# Patient Record
Sex: FEMALE | Race: ASIAN | Employment: PART TIME | URBAN - METROPOLITAN AREA
[De-identification: names, ages, dates, MRNs, and addresses within clinical notes are randomized per-mention and may not be internally consistent; named-entity substitution may affect disease eponyms.]

---

## 2018-07-08 ENCOUNTER — OFFICE VISIT (OUTPATIENT)
Dept: PRIMARY CARE CLINIC | Age: 48
End: 2018-07-08
Payer: MEDICARE

## 2018-07-08 VITALS
TEMPERATURE: 99.5 F | HEIGHT: 62 IN | HEART RATE: 64 BPM | SYSTOLIC BLOOD PRESSURE: 84 MMHG | BODY MASS INDEX: 20.98 KG/M2 | DIASTOLIC BLOOD PRESSURE: 62 MMHG | WEIGHT: 114 LBS | RESPIRATION RATE: 16 BRPM | OXYGEN SATURATION: 98 %

## 2018-07-08 DIAGNOSIS — A08.4 VIRAL GASTROENTERITIS: Primary | ICD-10-CM

## 2018-07-08 DIAGNOSIS — R11.15 NON-INTRACTABLE CYCLICAL VOMITING WITH NAUSEA: ICD-10-CM

## 2018-07-08 PROCEDURE — 99203 OFFICE O/P NEW LOW 30 MIN: CPT | Performed by: NURSE PRACTITIONER

## 2018-07-08 PROCEDURE — 4004F PT TOBACCO SCREEN RCVD TLK: CPT | Performed by: NURSE PRACTITIONER

## 2018-07-08 PROCEDURE — G8420 CALC BMI NORM PARAMETERS: HCPCS | Performed by: NURSE PRACTITIONER

## 2018-07-08 PROCEDURE — G8427 DOCREV CUR MEDS BY ELIG CLIN: HCPCS | Performed by: NURSE PRACTITIONER

## 2018-07-08 RX ORDER — ONDANSETRON 4 MG/1
4 TABLET, ORALLY DISINTEGRATING ORAL EVERY 8 HOURS PRN
Qty: 90 TABLET | Refills: 0 | Status: SHIPPED | OUTPATIENT
Start: 2018-07-08

## 2018-07-08 ASSESSMENT — ENCOUNTER SYMPTOMS
SINUS PRESSURE: 0
TROUBLE SWALLOWING: 0
VOMITING: 1
PHOTOPHOBIA: 0
ABDOMINAL PAIN: 1
SHORTNESS OF BREATH: 0
EYE REDNESS: 0
EYE PAIN: 0
RHINORRHEA: 0
EYE ITCHING: 0
NAUSEA: 0
COUGH: 0
DIARRHEA: 0
EYE DISCHARGE: 0

## 2018-07-08 NOTE — PATIENT INSTRUCTIONS
clean. Wash your hands, cutting boards, and countertops with hot soapy water frequently. · Cook meat until it is well done. · Do not eat raw eggs or uncooked sauces made with raw eggs. · Do not take chances. If food looks or tastes spoiled, throw it out. When should you call for help? Call 911 anytime you think you may need emergency care. For example, call if:    · You vomit blood or what looks like coffee grounds.     · You passed out (lost consciousness).     · You pass maroon or very bloody stools.    Call your doctor now or seek immediate medical care if:    · You have severe belly pain.     · You have signs of needing more fluids. You have sunken eyes, a dry mouth, and pass only a little dark urine.     · You feel like you are going to faint.     · You have increased belly pain that does not go away in 1 to 2 days.     · You have new or increased nausea, or you are vomiting.     · You have a new or higher fever.     · Your stools are black and tarlike or have streaks of blood.    Watch closely for changes in your health, and be sure to contact your doctor if:    · You are dizzy or lightheaded.     · You urinate less than usual, or your urine is dark yellow or brown.     · You do not feel better with each day that goes by. Where can you learn more? Go to https://[email or web address].[employer]. org and sign in to your Compare And Share account. Enter N142 in the [facility] box to learn more about \"Gastroenteritis: Care Instructions. \"     If you do not have an account, please click on the \"Sign Up Now\" link. Current as of: November 18, 2017  Content Version: 11.6  © 3769-6683 [employer]. Care instructions adapted under license by [facility] ([facility]). If you have questions about a medical condition or this instruction, always ask your healthcare professional. [address]  any warranty or liability for your use of this information.        Patient Education        Normal decrease pain. · Use pads instead of tampons if you have pain in your vagina. · Take anti-inflammatory medicines to reduce pain. These include ibuprofen (Advil, Motrin) and naproxen (Aleve). Be safe with medicines. Read and follow all instructions on the label. Start taking the recommended dose when symptoms begin or one day before your menstrual period starts. If you are trying to become pregnant, talk to your doctor before you use any medicine. To manage menstrual bleeding  · Tampons range from small to large, for light to heavy flow. You can place a tampon in your vagina by using the slender tube packaged with the tampon. Or you can tuck it in with a finger. Change the tampon at least every 4 to 8 hours. This helps prevent leakage and infection. · Pads range from thin and light to thick and super absorbent. They protect your clothing, with or without using a tampon. · Menstrual cups are inserted in the vagina to collect menstrual flow. You remove the menstrual cup to empty it. Some are disposable and some can be washed and used again. · Whatever you use, be sure to change it regularly. Tampons are ideal for activities that pads are not practical for, such as swimming. When should you call for help? Watch closely for changes in your health, and be sure to contact your doctor if you have any problems. Where can you learn more? Go to https://chpenachoeb.[employer]. org and sign in to your [employer] account. Enter G858 in the [employer] box to learn more about \"Normal Menstrual Cycle: Care Instructions. \"     If you do not have an account, please click on the \"Sign Up Now\" link. Current as of: Whitney 10, 2017  Content Version: 11.6  © 1220-4110 [employer], [employer]. Care instructions adapted under license by [facility] "[employer] [facility] ([facility]).  If you have questions about a medical condition or this instruction, always ask your healthcare professional. Miguelina Buckley disclaims any warranty or liability for your use of this information.

## 2018-07-26 ENCOUNTER — OFFICE VISIT (OUTPATIENT)
Dept: OBGYN CLINIC | Age: 48
End: 2018-07-26
Payer: MEDICARE

## 2018-07-26 VITALS
DIASTOLIC BLOOD PRESSURE: 64 MMHG | WEIGHT: 112 LBS | SYSTOLIC BLOOD PRESSURE: 98 MMHG | BODY MASS INDEX: 21.99 KG/M2 | HEIGHT: 60 IN

## 2018-07-26 DIAGNOSIS — Z11.51 SCREENING FOR HPV (HUMAN PAPILLOMAVIRUS): ICD-10-CM

## 2018-07-26 DIAGNOSIS — Z01.419 WELL WOMAN EXAM WITH ROUTINE GYNECOLOGICAL EXAM: Primary | ICD-10-CM

## 2018-07-26 DIAGNOSIS — Z12.31 SCREENING MAMMOGRAM, ENCOUNTER FOR: ICD-10-CM

## 2018-07-26 DIAGNOSIS — Z01.419 PAP SMEAR, AS PART OF ROUTINE GYNECOLOGICAL EXAMINATION: ICD-10-CM

## 2018-07-26 PROCEDURE — 99386 PREV VISIT NEW AGE 40-64: CPT | Performed by: NURSE PRACTITIONER

## 2018-07-26 NOTE — PROGRESS NOTES
The patient was asked if she would like a chaperone present for her intimate exam. She  Declined the chaperone.  Shwetha Kite  Patient declines student to be present at appointment

## 2018-07-26 NOTE — PROGRESS NOTES
SUBJECTIVE:   57 y.o.  female here for well woman exam. Pt is accompanied by her spouse \"Edison\" today. Who reports the pt recently had her gallbladder removed a week ago. They thought her pain was because of cramping with her periods, but when she was diagnosed with gallstones, he and his wife are unsure if it was the periods that were the problem. Pt's  reports the pt has not seen a doctor regularly since their only child was born in . Pt with regular menses and no complaints today. Pt with no method for contraception and  reports since his \"accident\" they have not been [place] couple\" and don't need birth control. Pt reports regular exercise with work as a  and takes multivitamins and fish oil. Pt denies smoking and drinks ETOH never. Review of Systems:  General ROS: negative  Psychological ROS: negative  EENT ROS: negative  Endocrine ROS: negative  Respiratory ROS: no cough, shortness of breath, or wheezing  Cardiovascular ROS: no chest pain or dyspnea on exertion  Gastrointestinal ROS: no abdominal pain, change in bowel habits, or black or bloody stools  Genito-Urinary ROS: no dysuria, trouble voiding, or hematuria  Musculoskeletal ROS: negative  Neurological ROS: no TIA or stroke symptoms  Dermatological ROS: negative    OBJECTIVE: Please see chart for additional documentation  BP 98/64   Ht 5' (1.524 m)   Wt 112 lb (50.8 kg)   LMP 2018 (Exact Date)   Breastfeeding? No   BMI 21.87 kg/m²     Pt's medical and surgical history reviewed    Physical Exam:  CONSTITUTIONAL: She appears well and afebrile, normal weight    NEUROLOGIC: Alert and oriented to person, place and time  NECK: no thyroidmegaly  BREASTS: Bilateral breasts without masses, skin changes or nipple discharge   LUNGS: Clear to ascultation bilaterally  CVS: regular rate and rhythm  LYMPHATIC: No palpable lymph nodes  ABDOMEN: bowel sounds active all four quadrants. No apparent liver or splenic organomegaly. No palpation due to pt tenderness with recent gallbladder surgery, steri strips and sutures noted intact. SKIN: warm and dry, normal texture and tone, no lesions    Pelvic Exam:   EFG: normal external genitalia  URETHRA: normal appearing without diverticula or lesions  VULVA: normal appearing vulva with no masses, tenderness or lesions  VAGINA: normal rugae, scant yellow discharge   CERVIX: nulliparous, cervical polyp noted  UTERUS: uterus is normal size, shape, consistency and nontender   ADNEXA: normal adnexa in size, nontender and no masses. PERINEUM: normal appearing without lesions or masses  ANUS: normal appearing without lesions or masses, no hemorrhoids    ASSESSMENT:   Well woman with routine gynecologic exam    PLAN:   Pap with hpv pending  Reinforced self breast exam, mammogram ordered  Advised Folate 0.4mg, Calcium 1000 mg and Vitamin D 400-800 IU total intake daily. Encouraged healthy lifestyle and moderated exercise (30 mins walking daily). Pt verbalized understanding. Information given in Chinese in regards to well visit, breast exams/mammograms and pap testing for pt review. Informed pt's  that pt has a cervical polyp, precautions given. Advised for pt to continue to monitor s/s of dysmenorrhea now that gallbladder is removed, and to RTC if s/s worsen or fail to improve, pt and her  verbalized understanding. Routine health screenings and precautions discussed, RTC 12 months for well woman exam or visits PRN. Approximately 30 minutes total face to face time was spent with pt and more than half the time was spent counseling/educating pt and/or coordinating care.

## 2018-08-07 DIAGNOSIS — Z01.419 PAP SMEAR, AS PART OF ROUTINE GYNECOLOGICAL EXAMINATION: ICD-10-CM

## 2018-08-07 DIAGNOSIS — Z11.51 SCREENING FOR HPV (HUMAN PAPILLOMAVIRUS): ICD-10-CM

## 2019-10-31 ENCOUNTER — HOSPITAL ENCOUNTER (OUTPATIENT)
Dept: WOMENS IMAGING | Age: 49
Discharge: HOME | End: 2019-11-02
Payer: COMMERCIAL

## 2019-10-31 ENCOUNTER — HOSPITAL ENCOUNTER (OUTPATIENT)
Dept: ULTRASOUND IMAGING | Age: 49
Discharge: HOME | End: 2019-11-02
Payer: COMMERCIAL

## 2019-10-31 DIAGNOSIS — R92.8 ABNORMAL MAMMOGRAM: ICD-10-CM

## 2019-10-31 DIAGNOSIS — R52 PAIN: ICD-10-CM

## 2019-10-31 PROCEDURE — G0279 TOMOSYNTHESIS, MAMMO: HCPCS

## 2019-10-31 PROCEDURE — 76642 ULTRASOUND BREAST LIMITED: CPT

## 2020-02-26 ENCOUNTER — TELEPHONE (OUTPATIENT)
Dept: GASTROENTEROLOGY | Age: 50
End: 2020-02-26

## 2020-03-05 ENCOUNTER — HOSPITAL ENCOUNTER (OUTPATIENT)
Dept: ULTRASOUND IMAGING | Age: 50
Discharge: HOME | End: 2020-03-07

## 2020-03-05 PROCEDURE — 76705 ECHO EXAM OF ABDOMEN: CPT

## 2025-09-05 ENCOUNTER — TRANSCRIBE ORDERS (OUTPATIENT)
Dept: ADMINISTRATIVE | Age: 55
End: 2025-09-05

## 2025-09-05 DIAGNOSIS — R07.9 CHEST PAIN, UNSPECIFIED TYPE: Primary | ICD-10-CM
